# Patient Record
Sex: MALE | Race: WHITE | Employment: FULL TIME | URBAN - METROPOLITAN AREA
[De-identification: names, ages, dates, MRNs, and addresses within clinical notes are randomized per-mention and may not be internally consistent; named-entity substitution may affect disease eponyms.]

---

## 2019-03-11 ENCOUNTER — EMPLOYEE WELLNESS (OUTPATIENT)
Dept: OTHER | Age: 29
End: 2019-03-11

## 2019-03-11 LAB
CHOLESTEROL, TOTAL: 166 MG/DL (ref 0–199)
GLUCOSE BLD-MCNC: 97 MG/DL (ref 70–99)
HDLC SERPL-MCNC: 52 MG/DL (ref 40–60)
LDL CHOLESTEROL CALCULATED: 94 MG/DL
TRIGL SERPL-MCNC: 101 MG/DL (ref 0–150)

## 2019-03-20 VITALS — WEIGHT: 174 LBS

## 2019-11-21 ENCOUNTER — OFFICE VISIT (OUTPATIENT)
Dept: ORTHOPEDIC SURGERY | Age: 29
End: 2019-11-21
Payer: COMMERCIAL

## 2019-11-21 VITALS
BODY MASS INDEX: 23.7 KG/M2 | WEIGHT: 175 LBS | HEIGHT: 72 IN | DIASTOLIC BLOOD PRESSURE: 88 MMHG | HEART RATE: 89 BPM | SYSTOLIC BLOOD PRESSURE: 131 MMHG

## 2019-11-21 DIAGNOSIS — M25.512 ACUTE PAIN OF LEFT SHOULDER: Primary | ICD-10-CM

## 2019-11-21 DIAGNOSIS — S43.432A LABRAL TEAR OF SHOULDER, LEFT, INITIAL ENCOUNTER: ICD-10-CM

## 2019-11-21 DIAGNOSIS — S43.002A ACQUIRED SUBLUXATION OF LEFT SHOULDER, INITIAL ENCOUNTER: ICD-10-CM

## 2019-11-21 PROCEDURE — 99203 OFFICE O/P NEW LOW 30 MIN: CPT | Performed by: ORTHOPAEDIC SURGERY

## 2019-11-21 ASSESSMENT — ENCOUNTER SYMPTOMS
EYES NEGATIVE: 1
GASTROINTESTINAL NEGATIVE: 1
RESPIRATORY NEGATIVE: 1
ALLERGIC/IMMUNOLOGIC NEGATIVE: 1

## 2020-01-09 ENCOUNTER — OFFICE VISIT (OUTPATIENT)
Dept: ORTHOPEDIC SURGERY | Age: 30
End: 2020-01-09
Payer: COMMERCIAL

## 2020-01-09 VITALS
WEIGHT: 175 LBS | HEIGHT: 72 IN | BODY MASS INDEX: 23.7 KG/M2 | HEART RATE: 84 BPM | DIASTOLIC BLOOD PRESSURE: 87 MMHG | SYSTOLIC BLOOD PRESSURE: 135 MMHG

## 2020-01-09 PROCEDURE — 99212 OFFICE O/P EST SF 10 MIN: CPT | Performed by: ORTHOPAEDIC SURGERY

## 2020-01-16 ENCOUNTER — EVALUATION (OUTPATIENT)
Dept: PHYSICAL THERAPY | Age: 30
End: 2020-01-16
Payer: COMMERCIAL

## 2020-01-16 PROCEDURE — 97530 THERAPEUTIC ACTIVITIES: CPT | Performed by: PHYSICAL THERAPIST

## 2020-01-16 PROCEDURE — 97110 THERAPEUTIC EXERCISES: CPT | Performed by: PHYSICAL THERAPIST

## 2020-01-16 PROCEDURE — 97161 PT EVAL LOW COMPLEX 20 MIN: CPT | Performed by: PHYSICAL THERAPIST

## 2020-01-16 NOTE — PLAN OF CARE
recommended a trial of PT to see if this can be treated nonoperatively. Dr. Kimberly López wants him to do a month of formal therapy then a month on his own and try to resume full activity. If he cannot perform activities to his satisfaction then they will discuss surgery. Pt reports no pain at rest.  His main c/o pain is when his left arm is extended and abducted (for example, reaching out to catch a ball). Pain can reach up to 8/10 when his arm is in this position. He is also cautious with lifting OH. He has some difficulty sleeping. He reports no instability but his left shoulder does click. He reports no numbness or tingling in left UE. He does not take any medication for his shoulder. He has no prior history of left shoulder problems. Pt works as a nurse in the [employer]. He enjoys playing softball and basketball and wants to get back to both sports. Pt goal:  \"Strengthen the muscles in my shoulder; get back to playing softball and basketball; avoid surgery if possible\"    *Pt is right hand dominant    Relevant Medical History: Curvature of the spine    Functional Disability Index:   Functional Assessment Tool Used:   UEFI (copy scanned into media)  Score:  69/80 = 86.25% (13.75% functional deficit)      Pain Scale: 0/10 at rest, up to 8/10 with arm extended and abducted  Easing factors: Rest  Provocative factors:  Arm Extended and abducted, sleeping    Type: []Constant   [x]Intermittent  []Radiating []Localized []other:     Numbness/Tingling: Pt denies left UE numbness or tingling    Occupation/School: Registered Nurse in the [employer]    Hobbies/Recreational Activities: Basketball, softball    Living Status:  Pt lives with his wife in a house. Prior Level of Function: Independent with ADLs and IADLs, able to play softball and basketball with no limitation. No prior history of left shoulder problems and no prior PT for the left shoulder.       OBJECTIVE:     *Pt is right hand Arthritic conditions   []Rheumatoid arthritis (M05.9)  []Osteoarthritis (M19.91)   Cardiovascular conditions   []Hypertension (I10)  []Hyperlipidemia (E78.5)  []Angina pectoris (I20)  []Atherosclerosis (I70)   Musculoskeletal conditions   []Disc pathology   []Congenital spine pathologies   []Prior surgical intervention  []Osteoporosis (M81.8)  []Osteopenia (M85.8)   Endocrine conditions   []Hypothyroid (E03.9)  []Hyperthyroid Gastrointestinal conditions   []Constipation (P30.74)   Metabolic conditions   []Morbid obesity (E66.01)  []Diabetes type 1(E10.65) or 2 (E11.65)   []Neuropathy (G60.9)     Pulmonary conditions   []Asthma (J45)  []Coughing   []COPD (J44.9)   Psychological Disorders  []Anxiety (F41.9)  []Depression (F32.9)   []Other:   [x]Other:    Curvature of the spine        Barriers to/and or personal factors that will affect rehab potential:              []Age  []Sex              [x]Motivation/Lack of Motivation - pt is very motivated to return to basketball and softball                       []Co-Morbidities              []Cognitive Function, education/learning barriers              []Environmental, home barriers               []profession/work barriers  []past PT/medical experience  []other:  Justification:      Falls Risk Assessment (30 days):   [x] Falls Risk assessed and no intervention required. [] Falls Risk assessed and Patient requires intervention due to being higher risk   TUG score (>12s at risk):     [] Falls education provided, including        ASSESSMENT: Pt presents with decreased left shoulder strength, decreased periscapular strength, and increased pain limiting his ability to lift OH, have his left arm extended and abducted, sleep, and play basketball and softball. He has full ROM at the left shoulder. Pt would benefit from skilled PT services to address these deficits and increase function.  Good rehab potential.         Functional Impairments   []Noted spinal or UE joint hypomobility   []Noted spinal or UE joint hypermobility   []Decreased UE functional ROM   [x]Decreased UE functional strength   []Abnormal reflexes/sensation/myotomal/dermatomal deficits   [x]Decreased RC/scapular/core strength and neuromuscular control   []other:      Functional Activity Limitations (from functional questionnaire and intake)   []Reduced ability to tolerate prolonged functional positions   []Reduced ability or difficulty with changes of positions or transfers between positions   []Reduced ability to maintain good posture and demonstrate good body mechanics with sitting, bending, and lifting   [] Reduced ability or tolerance with driving and/or computer work   [x]Reduced ability to sleep   [x]Reduced ability to perform lifting, reaching, carrying tasks   [x]Reduced ability to tolerate impact through UE   []Reduced ability to reach behind back   []Reduced ability to  or hold objects   [x]Reduced ability to throw or toss an object   []other:    Participation Restrictions   []Reduced participation in self care activities   [x]Reduced participation in home management activities   []Reduced participation in work activities   [x]Reduced participation in social activities. [x]Reduced participation in sport/recreation activities. Classification:   []Signs/symptoms consistent with post-surgical status including decreased ROM, strength and function.   []Signs/symptoms consistent with joint sprain/strain   []Signs/symptoms consistent with shoulder impingement   []Signs/symptoms consistent with shoulder/elbow/wrist tendinopathy   []Signs/symptoms consistent with Rotator cuff tear   [x]Signs/symptoms consistent with labral tear   []Signs/symptoms consistent with postural dysfunction    []Signs/symptoms consistent with Glenohumeral IR Deficit - <45 degrees   []Signs/symptoms consistent with facet dysfunction of cervical/thoracic spine    []Signs/symptoms consistent with pathology which may benefit from Dry needling     []other:     Prognosis/Rehab Potential:      []Excellent   [x]Good    []Fair   []Poor    Tolerance of evaluation/treatment:    []Excellent   [x]Good    []Fair   []Poor    Physical Therapy Evaluation Complexity Justification  [x] A history of present problem with:  [x] no personal factors and/or comorbidities that impact the plan of care;  []1-2 personal factors and/or comorbidities that impact the plan of care  []3 personal factors and/or comorbidities that impact the plan of care  [x] An examination of body systems using standardized tests and measures addressing any of the following: body structures and functions (impairments), activity limitations, and/or participation restrictions;:  [] a total of 1-2 or more elements   [x] a total of 3 or more elements   [] a total of 4 or more elements   [x] A clinical presentation with:  [x] stable and/or uncomplicated characteristics   [] evolving clinical presentation with changing characteristics  [] unstable and unpredictable characteristics;   [x] Clinical decision making of [x] low, [] moderate, [] high complexity using standardized patient assessment instrument and/or measurable assessment of functional outcome. [x] EVAL (LOW) 76604 (typically 20 minutes face-to-face)  [] EVAL (MOD) 89567 (typically 30 minutes face-to-face)  [] EVAL (HIGH) 36739 (typically 45 minutes face-to-face)  [] RE-EVAL     PLAN:  Frequency/Duration:  1 day per week for 4 Weeks:  INTERVENTIONS:  [x] Therapeutic exercise including: strength training, ROM, for Upper extremity and core   [x]  NMR activation and proprioception for UE, scap and Core   [x] Manual therapy as indicated for shoulder, scapula and spine to include: Dry Needling/IASTM, STM, PROM, Gr I-IV mobilizations, manipulation.    [x] Modalities as needed that may include: thermal agents, E-stim, Biofeedback, US, iontophoresis as indicated  [x] Patient education on joint protection, postural re-education, activity modification, progression of HEP. HEP instruction: Instructed patient in a HEP. Patient demonstrated exercises correctly. Handout with pictures and # of reps/sets was given to pt and a copy was scanned into media. Exercises are listed on flowsheet. Pt was instructed to ice after doing the exercises as needed. Patient Education:  Educated patient on Physical Therapy process. Also educated on diagnosis, related anatomy, pathology and prognosis. Reviewed patient goals/expectations and answered all questions. Patient displays understanding and in agreement with plan of care. Discussed importance of HEP and icing, activity modification, and role of PT      GOALS:  Patient stated goal: \"Strengthen the muscles in my shoulder; get back to playing softball and basketball; avoid surgery if possible\"  [] Progressing: [] Met: [] Not Met: [] Adjusted    Therapist goals for Patient:   Short Term Goals: To be achieved in: 2 weeks  1. Independent in HEP and progression per patient tolerance, in order to prevent re-injury. [] Progressing: [] Met: [] Not Met: [] Adjusted  2. Patient will have a decrease in left shoulder pain to 4/10 to facilitate improvement in movement, function, and ADLs as indicated by Functional Deficits. [] Progressing: [] Met: [] Not Met: [] Adjusted    Long Term Goals: To be achieved in: 4 weeks  1. Disability index score of 5% or less for the UEFI to assist with reaching prior level of function. [] Progressing: [] Met: [] Not Met: [] Adjusted  2. Patient will demonstrate an increase in left shoulder strength to 5/5 and periscapular strength to 4+/5 to allow for proper functional mobility as indicated by patients Functional Deficits. [] Progressing: [] Met: [] Not Met: [] Adjusted  3. Patient will return to all functional activities, including being able to lift OH and sleep, without increased symptoms or restriction. [] Progressing: [] Met: [] Not Met: [] Adjusted  4.  Patient will return to playing basketball and softball  (patient specific functional goal)    [] Progressing: [] Met: [] Not Met: [] Adjusted     Electronically signed by:  Chris Chi PT     Physical Therapist [facility] [id number]  Physical Therapist [place] License #[id number]

## 2020-01-16 NOTE — FLOWSHEET NOTE
3 sets 10 with 0# bilaterally Prone   Extension      Horizontal Abduction in ER      Serratus 3 sets 10 with 5#                      THERABANDS/CABLE COLUMN      Rows 3 sets 10 with silver band    Lats      Extension      Internal Rotation 3 sets  10 with blue band    External Rotation 3 sets  10 with blue band    Biceps      Triceps      PNF                                    Manual Intervention (26484)      Scar Massage      STM      Hawkgrips      GH joint mobilizations      Foamroll                  NMR re-education (53817)   CUES NEEDED   Plyoback      Therabar oscillations      Body Blade       Rhythmic Stabilization      Ball on the wall                              Therapeutic Activity (54049)      Core training      Swiss ball activities      Patient Education:  Diagnosis, prognosis, pt goal/expectations, role of PT X 10'                             Therapeutic Exercise and NMR EXR  [x] (97324) Provided verbal/tactile cueing for activities related to strengthening, flexibility, endurance, ROM  for improvements in scapular, scapulothoracic and UE control with self care, reaching, carrying, lifting, house/yardwork, driving/computer work. [x] (08484) Provided verbal/tactile cueing for activities related to improving balance, coordination, kinesthetic sense, posture, motor skill, proprioception  to assist with  scapular, scapulothoracic and UE control with self care, reaching, carrying, lifting, house/yardwork, driving/computer work. Therapeutic Activities:    [x] (19797 or 69916) Provided verbal/tactile cueing for activities related to improving balance, coordination, kinesthetic sense, posture, motor skill, proprioception and motor activation to allow for proper function of scapular, scapulothoracic and UE control with self care, carrying, lifting, driving/computer work.      Home Exercise Program:    [x] (40465) Reviewed/Progressed HEP activities related to strengthening, flexibility, endurance, ROM of requires continued skilled intervention: [x] Yes  [] No    Treatment/Activity Tolerance:  [x] Patient able to complete treatment  [] Patient limited by fatigue  [] Patient limited by pain    [] Patient limited by other medical complications  [] Other:           PLAN: 1x/week for 4 weeks for flexibility, strengthening, scap stab exercises, manual therapy prn, HEP, pt education, and modalities prn (1/16/20)  [] Continue per plan of care [] Alter current plan   [x] Plan of care initiated [] Hold pending MD visit [] Discharge      Electronically signed by:  Charu Davila PT     Physical Therapist [facility] [phone] [id number]  Physical Therapist [place] License #[id number]    Note: If patient does not return for scheduled/ recommended follow up visits, this note will serve as a discharge from care along with most recent update on progress.

## 2020-01-18 PROBLEM — M25.512 LEFT SHOULDER PAIN: Status: ACTIVE | Noted: 2020-01-18

## 2020-01-18 PROBLEM — R29.898 WEAKNESS OF SHOULDER: Status: ACTIVE | Noted: 2020-01-18

## 2020-01-18 PROBLEM — S43.432A LABRAL TEAR OF SHOULDER, LEFT, INITIAL ENCOUNTER: Status: ACTIVE | Noted: 2020-01-18

## 2020-01-30 ENCOUNTER — TREATMENT (OUTPATIENT)
Dept: PHYSICAL THERAPY | Age: 30
End: 2020-01-30

## 2020-03-02 ENCOUNTER — EMPLOYEE WELLNESS (OUTPATIENT)
Dept: OTHER | Age: 30
End: 2020-03-02

## 2020-03-02 LAB
CHOLESTEROL, TOTAL: 144 MG/DL (ref 0–199)
GLUCOSE BLD-MCNC: 94 MG/DL (ref 70–99)
HDLC SERPL-MCNC: 41 MG/DL (ref 40–60)
LDL CHOLESTEROL CALCULATED: 81 MG/DL
TRIGL SERPL-MCNC: 108 MG/DL (ref 0–150)

## 2020-03-05 LAB
3-OH-COTININE: 12 NG/ML
COTININE: 12 NG/ML
NICOTINE: <2 NG/ML

## 2020-05-01 LAB
SARS-COV-2: NOT DETECTED
SOURCE: NORMAL

## 2020-10-19 VITALS — BODY MASS INDEX: 24.14 KG/M2 | WEIGHT: 178 LBS

## 2021-07-27 LAB
CHOLESTEROL, TOTAL: 159 MG/DL (ref 0–199)
GLUCOSE BLD-MCNC: 93 MG/DL (ref 70–99)
HDLC SERPL-MCNC: 43 MG/DL (ref 40–60)
LDL CHOLESTEROL CALCULATED: 95 MG/DL
TRIGL SERPL-MCNC: 105 MG/DL (ref 0–150)

## 2023-06-29 LAB
CHOLEST SERPL-MCNC: 197 MG/DL (ref 0–199)
GLUCOSE SERPL-MCNC: 98 MG/DL (ref 70–99)
HDLC SERPL-MCNC: 45 MG/DL (ref 40–60)
LDLC SERPL CALC-MCNC: 124 MG/DL
TRIGL SERPL-MCNC: 138 MG/DL (ref 0–150)

## 2023-09-16 SDOH — HEALTH STABILITY: PHYSICAL HEALTH: ON AVERAGE, HOW MANY DAYS PER WEEK DO YOU ENGAGE IN MODERATE TO STRENUOUS EXERCISE (LIKE A BRISK WALK)?: 7 DAYS

## 2023-09-16 SDOH — HEALTH STABILITY: PHYSICAL HEALTH: ON AVERAGE, HOW MANY MINUTES DO YOU ENGAGE IN EXERCISE AT THIS LEVEL?: 20 MIN

## 2023-09-16 ASSESSMENT — SOCIAL DETERMINANTS OF HEALTH (SDOH)
WITHIN THE LAST YEAR, HAVE YOU BEEN HUMILIATED OR EMOTIONALLY ABUSED IN OTHER WAYS BY YOUR PARTNER OR EX-PARTNER?: NO
WITHIN THE LAST YEAR, HAVE YOU BEEN KICKED, HIT, SLAPPED, OR OTHERWISE PHYSICALLY HURT BY YOUR PARTNER OR EX-PARTNER?: NO
WITHIN THE LAST YEAR, HAVE YOU BEEN AFRAID OF YOUR PARTNER OR EX-PARTNER?: NO
WITHIN THE LAST YEAR, HAVE TO BEEN RAPED OR FORCED TO HAVE ANY KIND OF SEXUAL ACTIVITY BY YOUR PARTNER OR EX-PARTNER?: NO

## 2023-09-19 ENCOUNTER — OFFICE VISIT (OUTPATIENT)
Dept: ORTHOPEDIC SURGERY | Age: 33
End: 2023-09-19
Payer: COMMERCIAL

## 2023-09-19 VITALS — BODY MASS INDEX: 26.28 KG/M2 | WEIGHT: 194 LBS | HEIGHT: 72 IN | RESPIRATION RATE: 16 BRPM

## 2023-09-19 DIAGNOSIS — G89.29 CHRONIC PAIN OF LEFT KNEE: Primary | ICD-10-CM

## 2023-09-19 DIAGNOSIS — M25.562 CHRONIC PAIN OF LEFT KNEE: Primary | ICD-10-CM

## 2023-09-19 PROCEDURE — 99203 OFFICE O/P NEW LOW 30 MIN: CPT | Performed by: ORTHOPAEDIC SURGERY

## 2023-09-19 RX ORDER — MIRTAZAPINE 15 MG/1
TABLET, FILM COATED ORAL
COMMUNITY
Start: 2020-12-14

## 2023-09-27 ENCOUNTER — HOSPITAL ENCOUNTER (OUTPATIENT)
Dept: PHYSICAL THERAPY | Age: 33
Setting detail: THERAPIES SERIES
Discharge: HOME | End: 2023-09-27
Attending: ORTHOPAEDIC SURGERY
Payer: COMMERCIAL

## 2023-09-27 DIAGNOSIS — G89.29 CHRONIC PAIN OF LEFT KNEE: Primary | ICD-10-CM

## 2023-09-27 DIAGNOSIS — M25.562 CHRONIC PAIN OF LEFT KNEE: Primary | ICD-10-CM

## 2023-09-27 PROCEDURE — 97530 THERAPEUTIC ACTIVITIES: CPT | Performed by: PHYSICAL THERAPIST

## 2023-09-27 PROCEDURE — 97161 PT EVAL LOW COMPLEX 20 MIN: CPT | Performed by: PHYSICAL THERAPIST

## 2023-09-27 PROCEDURE — 97110 THERAPEUTIC EXERCISES: CPT | Performed by: PHYSICAL THERAPIST

## 2023-09-27 NOTE — FLOWSHEET NOTE
[address] [facility] a Department of [address]  [address] [facility]  Office: [phone]  Fax:  [phone]        Physical Therapy: TREATMENT/PROGRESS NOTE   Patient: Nika Bond (63 y.o. male)   Treatment Date: 2023   :  1990 MRN: [id number]   Visit #: 1    Insurance: Payor: Shilo Kinney / Plan: Vamsi Puente [facility] / Product Type: *No Product type* /   Insurance ID: [id number] - ([facility])  Secondary Insurance (if applicable):    Treatment Diagnosis:     ICD-10-CM    1.  Chronic pain of left knee  M25.562     G89.29          Medical Diagnosis:    Chronic pain of left knee [N41.413, G89.29]   Referring Physician: Scottie Barcenas MD  PCP: Simón Lanier MD                             Plan of care signed (Y/N):     Date of Patient follow up with Physician:      Progress Report/POC: EVAL today    POC update due (10 Rx/or 30 days whichever is less [facility]): 24 Oct 2023     Visit # Insurance Allowable Auth Needed   1 30 []Yes    [x]No     Latex Allergy:  [x]NO      []YES    Preferred Language for Healthcare:   [x]English       []other:    SUBJECTIVE EXAMINATION     Patient Report/Comments: see eval    OBJECTIVE EXAMINATION     Observation:     Test measurements: see eval    Flexibility L R Comment   Hamstring      Gastroc      ITB      Quad                ROM PROM AROM Overpressure Comment    L R L R L R    Flexion          Extension                                  Strength L R Comment   Quad      Hamstring      Gastroc      Hip flexor      Hip ABD                    RESTRICTIONS/PRECAUTIONS: NA    Exercises/Interventions:     Exercises/Interventions:     Exercise/Equipment Resistance and Repetitions Other comments   Stretching     Hamstring 3x:30 Did review standing for him to do when at work   Hip Flexion     ITB- Rope 3x:30 Did one rep of standing and one of figure [address] [place]

## 2024-05-27 ENCOUNTER — NURSE TRIAGE (OUTPATIENT)
Dept: OTHER | Facility: CLINIC | Age: 34
End: 2024-05-27

## 2024-05-27 NOTE — TELEPHONE ENCOUNTER
Location of patient: [place]    Subjective: Caller states constipated, usually has a BM TID, Last BM was Saturday morning,has taken several laxatives and enemas and thinks he is impacted with stool. Has never had problem with constipation in the past.      Current Symptoms: generalized abd pain, pubic area and lower back 4/10 at present. Has taken several laxatives and only water is coming back out, no stool, meets resistance in rectum when trying to insert enema. Thinks stool is stuck     Onset: yesterday     Associated Symptoms: NA    Pain Severity: stomach, pubic area and to lower back 4/10     Temperature: maybe yest does not have therm     What has been tried: last night 8p drank bottle mg citrate, watery output but no stool today small watery stool,felt ok  earlier today then 3p.m. felt bad and took 2 stool softeners, oral dulcolax 1 hour ago, normal saline enema and suppos 3-4 hours ago     Recommended disposition: See HCP within 4 Hours (or PCP triage)[facility]/ER     Care advice provided, patient verbalizes understanding; denies any other questions or concerns; instructed to call back for any new or worsening symptoms.    Outcome; Agrees to go to U[facility]/ER within the next 4 hours.     Attention Provider:  Thank you for allowing me to participate in the care of your patient.  The patient was connected to triage in response to symptoms provided.   Please do not respond through this encounter as the response is not directed to a shared pool.    Reason for Disposition   [1] Rectal pain or fullness from fecal impaction (rectum full of stool) AND [2] NOT better after SITZ bath, suppository or enema    Protocols used: Constipation-ADULT-

## 2024-08-19 LAB
CHOLEST SERPL-MCNC: 192 MG/DL (ref 0–199)
GLUCOSE SERPL-MCNC: 94 MG/DL (ref 70–99)
HDLC SERPL-MCNC: 41 MG/DL (ref 40–60)
LDLC SERPL CALC-MCNC: 127 MG/DL
TRIGL SERPL-MCNC: 121 MG/DL (ref 0–150)

## 2024-08-26 ENCOUNTER — HOSPITAL ENCOUNTER (OUTPATIENT)
Dept: GENERAL RADIOLOGY | Age: 34
Discharge: HOME | End: 2024-08-26
Attending: INTERNAL MEDICINE
Payer: COMMERCIAL

## 2024-08-26 ENCOUNTER — HOSPITAL ENCOUNTER (OUTPATIENT)
Age: 34
Discharge: HOME | End: 2024-08-26
Payer: COMMERCIAL

## 2024-08-26 DIAGNOSIS — M79.672 LEFT FOOT PAIN: Primary | ICD-10-CM

## 2024-08-26 DIAGNOSIS — M79.672 LEFT FOOT PAIN: ICD-10-CM

## 2024-08-26 PROCEDURE — 73630 X-RAY EXAM OF FOOT: CPT

## 2025-04-07 LAB
CHOLEST SERPL-MCNC: 201 MG/DL (ref 0–199)
GLUCOSE SERPL-MCNC: 98 MG/DL (ref 70–99)
HDLC SERPL-MCNC: 44 MG/DL (ref 40–60)
LDLC SERPL CALC-MCNC: 129 MG/DL
TRIGL SERPL-MCNC: 140 MG/DL (ref 0–150)